# Patient Record
Sex: MALE | Race: AMERICAN INDIAN OR ALASKA NATIVE
[De-identification: names, ages, dates, MRNs, and addresses within clinical notes are randomized per-mention and may not be internally consistent; named-entity substitution may affect disease eponyms.]

---

## 2018-01-09 ENCOUNTER — HOSPITAL ENCOUNTER (EMERGENCY)
Dept: HOSPITAL 31 - C.ER | Age: 43
Discharge: HOME | End: 2018-01-09
Payer: MEDICARE

## 2018-01-09 VITALS — OXYGEN SATURATION: 98 %

## 2018-01-09 VITALS
SYSTOLIC BLOOD PRESSURE: 122 MMHG | TEMPERATURE: 98.5 F | HEART RATE: 93 BPM | RESPIRATION RATE: 20 BRPM | DIASTOLIC BLOOD PRESSURE: 90 MMHG

## 2018-01-09 VITALS — BODY MASS INDEX: 23.7 KG/M2

## 2018-01-09 DIAGNOSIS — M54.5: Primary | ICD-10-CM

## 2018-01-09 NOTE — RAD
PROCEDURE:  Radiographs of the Lumbar Spine.



HISTORY:

fall 12/27



COMPARISON:

Lumbar spine radiographs performed 8/17/16



FINDINGS:



BONES:

Alignment appears satisfactory. No listhesis. No acute displaced 

fracture identified.



DISC SPACES:

Unremarkable.



OTHER FINDINGS:

None.



IMPRESSION:

No acute displaced fracture or subluxation identified.
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
## 2018-01-30 ENCOUNTER — HOSPITAL ENCOUNTER (EMERGENCY)
Dept: HOSPITAL 31 - C.ER | Age: 43
Discharge: HOME | End: 2018-01-30
Payer: MEDICARE

## 2018-01-30 VITALS
SYSTOLIC BLOOD PRESSURE: 127 MMHG | TEMPERATURE: 97 F | RESPIRATION RATE: 18 BRPM | OXYGEN SATURATION: 98 % | HEART RATE: 98 BPM | DIASTOLIC BLOOD PRESSURE: 80 MMHG

## 2018-01-30 VITALS — BODY MASS INDEX: 23.7 KG/M2

## 2018-01-30 DIAGNOSIS — J02.9: Primary | ICD-10-CM

## 2018-01-30 NOTE — C.PDOC
History Of Present Illness


41 y/o male presents to ED with complaints of sore throat for 4 days and 

productive cough developed today. Patient states he took Tylenol with mild 

relief but symptoms worsen, took 3 dose of amoxicillin from a friend with no 

relief 3 days ago. Patient reports chills but denies documented fever, neck pain

, neck stiffness, nausea, vomiting, chest pain, sob or any other complaints at 

this time. 


Time Seen by Provider: 01/30/18 08:49


Chief Complaint (Nursing): ENT Problem


History Per: Patient


History/Exam Limitations: None


Onset/Duration Of Symptoms: Days


Current Symptoms Are (Timing): Still Present





Past Medical History


Reviewed: Historical Data, Nursing Documentation, Vital Signs


Vital Signs: 


 Last Vital Signs











Temp  97 F L  01/30/18 08:16


 


Pulse  98 H  01/30/18 08:16


 


Resp  18   01/30/18 09:20


 


BP  127/80   01/30/18 08:16


 


Pulse Ox  98   01/30/18 09:52














- Medical History


PMH: Back Problems


Surgical History: No Surg Hx


Family History: States: No Known Family Hx





- Social History


Hx Alcohol Use: No


Hx Substance Use: No





- Immunization History


Hx Tetanus Toxoid Vaccination: No


Hx Influenza Vaccination: No


Hx Pneumococcal Vaccination: No





Review Of Systems


Constitutional: Positive for: Chills.  Negative for: Fever


ENT: Positive for: Throat Pain


Cardiovascular: Negative for: Chest Pain


Respiratory: Negative for: Shortness of Breath


Gastrointestinal: Negative for: Nausea, Vomiting


Musculoskeletal: Negative for: Neck Pain


Skin: Negative for: Rash





Physical Exam





- Physical Exam


Appears: Non-toxic, No Acute Distress


Skin: Warm, Dry, No Rash


Head: Atraumatic, Normacephalic


Eye(s): bilateral: Normal Inspection


Ear(s): Bilateral: Normal


Oral Mucosa: Moist


Throat: Erythema, Exudate, Other (Uvula slightly deviated to right )


Neck: Supple


Lymphatic: Adenopathy (Palpable on right chin)


Cardiovascular: Rhythm Regular


Respiratory: Normal Breath Sounds, No Rales, No Rhonchi, No Wheezing


Gastrointestinal/Abdominal: Soft, No Tenderness, No Guarding, No Rebound


Neurological/Psych: Oriented x3





ED Course And Treatment


O2 Sat by Pulse Oximetry: 98 (RA)


Pulse Ox Interpretation: Normal





Disposition


Counseled Patient/Family Regarding: Diagnosis, Need For Followup, Rx Given





- Disposition


Referrals: 


[facility] [Outside]


Disposition: HOME/ ROUTINE


Disposition Time: 09:14


Condition: STABLE


Prescriptions: 


Ibuprofen [Motrin] 600 mg PO TID #15 tab


Penicillin VK [Penicillin VK Tab] 500 mg PO Q6H #40 tab


Instructions:  Pharyngitis (ED)


Forms:  [facility] (English), General Discharge Instructions





- POA


Present On Arrival: None





- Clinical Impression


Clinical Impression: 


 Pharyngitis








- Scribe Statement


The provider has reviewed the documentation as recorded by the Shelton Livingston





All medical record entries made by the Shelton were at my direction and 

personally dictated by me. I have reviewed the chart and agree that the record 

accurately reflects my personal performance of the history, physical exam, 

medical decision making, and the department course for this patient. I have 

also personally directed, reviewed, and agree with the discharge instructions 

and disposition.

## 2018-06-10 ENCOUNTER — HOSPITAL ENCOUNTER (EMERGENCY)
Dept: HOSPITAL 31 - C.ER | Age: 43
Discharge: HOME | End: 2018-06-10
Payer: MEDICARE

## 2018-06-10 VITALS
DIASTOLIC BLOOD PRESSURE: 75 MMHG | HEART RATE: 70 BPM | SYSTOLIC BLOOD PRESSURE: 117 MMHG | TEMPERATURE: 99.2 F | OXYGEN SATURATION: 99 %

## 2018-06-10 VITALS — RESPIRATION RATE: 17 BRPM

## 2018-06-10 VITALS — BODY MASS INDEX: 23.7 KG/M2

## 2018-06-10 DIAGNOSIS — H60.92: Primary | ICD-10-CM

## 2018-06-10 NOTE — C.PDOC
History Of Present Illness


43 year old male presents to the ED c/o left ear pain and discharge for the 

past 2-3 days. Patient denies any fever, chills, nausea, vomit, recent swimming

, fall, injury, trauma. 


Time Seen by Provider: 06/10/18 11:15


Chief Complaint (Nursing): ENT Problem


History Per: Patient


History/Exam Limitations: None


Onset/Duration Of Symptoms: Days (2-3)


Current Symptoms Are (Timing): Still Present


Quality (Ear): Pain W/Touch, Discharge


Anticoagulant/Antiplatlet Use?: No


Recent Aspirin Use: No





Past Medical History


Reviewed: Historical Data, Nursing Documentation, Vital Signs


Vital Signs: 


 Last Vital Signs











Temp  99.2 F   06/10/18 11:02


 


Pulse  70   06/10/18 11:02


 


Resp  17   06/10/18 11:45


 


BP  117/75   06/10/18 11:02


 


Pulse Ox  99   06/10/18 11:42














- Medical History


PMH: Back Problems


Surgical History: No Surg Hx


Family History: States: Unknown Family Hx





- Social History


Hx Alcohol Use: No


Hx Substance Use: No





- Immunization History


Hx Tetanus Toxoid Vaccination: No


Hx Influenza Vaccination: No


Hx Pneumococcal Vaccination: No





Review Of Systems


Constitutional: Negative for: Fever


ENT: Positive for: Ear Pain, Ear Discharge.  Negative for: Nose Discharge, Nose 

Congestion, Throat Pain


Respiratory: Negative for: Cough


Gastrointestinal: Negative for: Nausea, Vomiting


Skin: Negative for: Rash





Physical Exam





- Physical Exam


Appears: Non-toxic, No Acute Distress


Skin: Normal Color, Warm, Dry


Head: Atraumatic, Normacephalic


Eye(s): bilateral: Normal Inspection


Ear(s): Left: Other (tragus tenderness, ear cannal erythematous and some 

exudates), Right: Normal


Oral Mucosa: Moist


Throat: Normal, No Erythema, No Exudate


Extremity: Normal ROM


Neurological/Psych: Oriented x3, Normal Speech


Gait: Steady





ED Course And Treatment


O2 Sat by Pulse Oximetry: 99 (ON RA)


Pulse Ox Interpretation: Normal





Medical Decision Making


Medical Decision Making: 


Impression: left ear pain


Plan:


* Ciloxan 1 drop 





Disposition


Counseled Patient/Family Regarding: Diagnosis, Need For Followup, Rx Given





- Disposition


Referrals: 


Behin,Babak, MD [Staff Provider] - 


Disposition: HOME/ ROUTINE


Disposition Time: 11:40


Condition: STABLE


Additional Instructions: 


Apply 2-4 drops in affected ear twice daily for one week


Take Tylenol or Motrin for pain


Follow up with ENT if symptoms do not improve


Prescriptions: 


Ciprofloxacin/Hydrocortisone [Cipro Hc Otic Suspension] 4 drop AS BID 7 Days #1 

bottle


Instructions:  Outer Ear Infection (DC)


Forms:  [facility] (English)





- POA


Present On Arrival: None





- Clinical Impression


Clinical Impression: 


 Otitis externa








- PA / NP / Resident Statement


MD/DO has reviewed & agrees with the documentation as recorded.





- Scribe Statement


The provider has reviewed the documentation as recorded by the Scribe


Junaid Ly





All medical record entries made by the Scribelle were at my direction and 

personally dictated by me. I have reviewed the chart and agree that the record 

accurately reflects my personal performance of the history, physical exam, 

medical decision making, and the department course for this patient. I have 

also personally directed, reviewed, and agree with the discharge instructions 

and disposition.

## 2018-11-10 ENCOUNTER — HOSPITAL ENCOUNTER (EMERGENCY)
Dept: HOSPITAL 31 - C.ER | Age: 43
Discharge: HOME | End: 2018-11-10
Payer: MEDICARE

## 2018-11-10 VITALS — BODY MASS INDEX: 23.7 KG/M2

## 2018-11-10 VITALS — OXYGEN SATURATION: 98 %

## 2018-11-10 VITALS — DIASTOLIC BLOOD PRESSURE: 68 MMHG | SYSTOLIC BLOOD PRESSURE: 132 MMHG | HEART RATE: 74 BPM | TEMPERATURE: 98.1 F

## 2018-11-10 VITALS — RESPIRATION RATE: 18 BRPM

## 2018-11-10 DIAGNOSIS — H93.8X1: Primary | ICD-10-CM

## 2018-11-10 NOTE — C.PDOC
History Of Present Illness





42 y/o male c/o right ear fullness x 3 weeks, with decreased hearing when lying 

down on right side with ear to pillow.  no fever or chills, no discharge from 

ear. no pain in ear. pt does use q-tips, has tried hydrogen peroxide with no 

improvement.  


Time Seen by Provider: 11/10/18 09:56


Chief Complaint (Nursing): ENT Problem


History Per: Patient


History/Exam Limitations: None


Onset/Duration Of Symptoms: Days (21)


Current Symptoms Are (Timing): Still Present


Quality (Ear): Other (fullness)


Symptoms Have Been: Continuous


Severity: Mild





Past Medical History


Reviewed: Historical Data, Nursing Documentation, Vital Signs


Vital Signs: 





                                Last Vital Signs











Temp  98 F   11/10/18 09:04


 


Pulse  77   11/10/18 09:04


 


Resp  18   11/10/18 09:04


 


BP  127/72   11/10/18 09:04


 


Pulse Ox  98   11/10/18 09:04














- Medical History


PMH: Back Problems


Family History: States: Unknown Family Hx





- Social History


Hx Alcohol Use: No


Hx Substance Use: No





- Immunization History


Hx Tetanus Toxoid Vaccination: No


Hx Influenza Vaccination: No


Hx Pneumococcal Vaccination: No





Review Of Systems


Constitutional: Negative for: Fever, Chills


ENT: Positive for: Other (ear fullness, right).  Negative for: Ear Pain, Ear 

Discharge, Mouth Pain, Mouth Swelling, Throat Pain


Respiratory: Negative for: Cough


Skin: Negative for: Rash


Neurological: Negative for: Weakness, Numbness





Physical Exam





- Physical Exam


Appears: Non-toxic, No Acute Distress


Skin: Warm, Dry


Head: Atraumatic, Normacephalic


Eye(s): bilateral: Normal Inspection


Ear(s): Left: Normal, Right: Other (whitish debris in canal. no erythema noted, 

tm not visualized, narrow canals.  )


Nose: No Discharge





ED Course And Treatment


O2 Sat by Pulse Oximetry: 98





Medical Decision Making


Medical Decision Making: 





pt with ear fullness, with whitish debris in canal, unable to visualize tm.  

possible cerumen vs otitis externa;  will tx with antibiotics topical and f/u 

ent





Disposition


Counseled Patient/Family Regarding: Diagnosis, Need For Followup, Rx Given





- Disposition


Referrals: 


Davina Cabral MD [Medical Doctor] - 


Behin,Babak, MD [Staff Provider] - 


Disposition: HOME/ ROUTINE


Disposition Time: 10:13


Condition: GOOD


Additional Instructions: 


Use ear drops as prescribed, Do not use q-tips. Follow up with Dr Cabral and also

with Dr Behin (ear doctor). 


Prescriptions: 


Neomycin/Polymyxin/Hydrocortis [Cortisporin Otic Susp] 4 drop AD QID #1 bottle


Forms:  [facility] (English), General Discharge Instructions





- Clinical Impression


Clinical Impression: 


 Sensation of fullness in right ear